# Patient Record
Sex: FEMALE | Race: OTHER | NOT HISPANIC OR LATINO
[De-identification: names, ages, dates, MRNs, and addresses within clinical notes are randomized per-mention and may not be internally consistent; named-entity substitution may affect disease eponyms.]

---

## 2017-04-03 ENCOUNTER — APPOINTMENT (OUTPATIENT)
Dept: GYNECOLOGIC ONCOLOGY | Facility: CLINIC | Age: 82
End: 2017-04-03

## 2017-05-22 ENCOUNTER — APPOINTMENT (OUTPATIENT)
Dept: GYNECOLOGIC ONCOLOGY | Facility: CLINIC | Age: 82
End: 2017-05-22

## 2017-05-22 VITALS
SYSTOLIC BLOOD PRESSURE: 180 MMHG | WEIGHT: 153 LBS | DIASTOLIC BLOOD PRESSURE: 101 MMHG | HEART RATE: 82 BPM | BODY MASS INDEX: 26.26 KG/M2 | RESPIRATION RATE: 16 BRPM

## 2017-09-11 ENCOUNTER — APPOINTMENT (OUTPATIENT)
Dept: GYNECOLOGIC ONCOLOGY | Facility: CLINIC | Age: 82
End: 2017-09-11
Payer: MEDICARE

## 2017-09-11 VITALS
WEIGHT: 153 LBS | DIASTOLIC BLOOD PRESSURE: 79 MMHG | SYSTOLIC BLOOD PRESSURE: 168 MMHG | HEART RATE: 89 BPM | BODY MASS INDEX: 26.12 KG/M2 | HEIGHT: 64 IN

## 2017-09-11 PROCEDURE — 99214 OFFICE O/P EST MOD 30 MIN: CPT

## 2018-03-05 ENCOUNTER — APPOINTMENT (OUTPATIENT)
Dept: GYNECOLOGIC ONCOLOGY | Facility: CLINIC | Age: 83
End: 2018-03-05
Payer: MEDICARE

## 2018-03-05 VITALS — HEART RATE: 83 BPM | WEIGHT: 158.5 LBS | OXYGEN SATURATION: 97 % | BODY MASS INDEX: 27.06 KG/M2 | HEIGHT: 64 IN

## 2018-03-05 PROCEDURE — 99214 OFFICE O/P EST MOD 30 MIN: CPT

## 2018-06-11 ENCOUNTER — APPOINTMENT (OUTPATIENT)
Dept: GYNECOLOGIC ONCOLOGY | Facility: CLINIC | Age: 83
End: 2018-06-11

## 2018-06-25 ENCOUNTER — APPOINTMENT (OUTPATIENT)
Dept: GYNECOLOGIC ONCOLOGY | Facility: CLINIC | Age: 83
End: 2018-06-25
Payer: MEDICARE

## 2018-06-25 DIAGNOSIS — C80.1 MALIGNANT (PRIMARY) NEOPLASM, UNSPECIFIED: ICD-10-CM

## 2018-06-25 DIAGNOSIS — R10.2 PELVIC AND PERINEAL PAIN: ICD-10-CM

## 2018-06-25 DIAGNOSIS — C54.1 MALIGNANT NEOPLASM OF ENDOMETRIUM: ICD-10-CM

## 2018-06-25 DIAGNOSIS — G62.9 POLYNEUROPATHY, UNSPECIFIED: ICD-10-CM

## 2018-06-25 DIAGNOSIS — N95.0 POSTMENOPAUSAL BLEEDING: ICD-10-CM

## 2018-06-25 DIAGNOSIS — I10 ESSENTIAL (PRIMARY) HYPERTENSION: ICD-10-CM

## 2018-06-25 PROCEDURE — 99214 OFFICE O/P EST MOD 30 MIN: CPT

## 2018-06-25 PROCEDURE — 36415 COLL VENOUS BLD VENIPUNCTURE: CPT

## 2020-08-17 ENCOUNTER — APPOINTMENT (OUTPATIENT)
Dept: GYNECOLOGIC ONCOLOGY | Facility: CLINIC | Age: 85
End: 2020-08-17

## 2021-01-11 ENCOUNTER — APPOINTMENT (OUTPATIENT)
Dept: GYNECOLOGIC ONCOLOGY | Facility: CLINIC | Age: 86
End: 2021-01-11
Payer: MEDICARE

## 2021-01-11 VITALS
WEIGHT: 165 LBS | BODY MASS INDEX: 28.17 KG/M2 | HEART RATE: 77 BPM | TEMPERATURE: 97.7 F | HEIGHT: 64 IN | SYSTOLIC BLOOD PRESSURE: 108 MMHG | OXYGEN SATURATION: 97 % | DIASTOLIC BLOOD PRESSURE: 68 MMHG

## 2021-01-11 PROCEDURE — 99214 OFFICE O/P EST MOD 30 MIN: CPT

## 2021-01-11 NOTE — REVIEW OF SYSTEMS
[Negative] : Musculoskeletal [Neuropathy] : neuropathy [Dysuria] : dysuria [Fatigue] : fatigue [Dyspareunia] : no dyspareunia [FreeTextEntry1] : hair is grown back  [FreeTextEntry2] : significant in toes, grade 2.  has used a cane but she rarely uses it now. [FreeTextEntry4] : had some leukocytes in urine.  had some dysuria and took some medication for the discomfort.   [FreeTextEntry7] : mild but still active and travelling all over [place] and [place].   [FreeTextEntry8] : wears glasses which are unchanged.

## 2021-01-11 NOTE — PHYSICAL EXAM
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 [de-identified] : using cane, but can ambulate without it

## 2021-01-11 NOTE — DISCUSSION/SUMMARY
[FreeTextEntry1] : -Clinically TAURUS, with mild peripheral neuropathy\par -Will follow up 1 year\par

## 2021-01-11 NOTE — HISTORY OF PRESENT ILLNESS
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 Normal endometrium 0.8cm. Absent ovaries. \par 2) D&C, hysteroscopy 10/18/16\par    a) High grade carcinoma, favor serous\par 3) Advanced laparoscopic robotic assisted hysterectomy, BSO, sentinel lymph node biopsy, pelvic lymphadenectomy, omentectomy 11/1/16\par     a) Endometrial serous carcinoma DOI 6.7% Tumor size 6cm  neg cervix neg LVSI neg omentum left sentinel Ln 0/1, PLN 0/8\par 4)  Carboplatin/ paclitaxel x 6 completed 3/27/2017 ([facility])\par a) peripheral neuropathy \par 5)  VBRT, ([facility])  x3, completed 5/2017\par 6) CT C/A/P 7/2017 TAURUS\par \par Patient presents today for surveillance visit. First visit in 1.5 years. Feeling well. No abdominal pain, change in bowel habits, vaginal bleeding or discharge. \par \par Health Maintenance\par Mammogram 6/2020\par Reports recent normal colonoscopy

## 2022-07-01 ENCOUNTER — NON-APPOINTMENT (OUTPATIENT)
Age: 87
End: 2022-07-01

## 2022-09-22 ENCOUNTER — NON-APPOINTMENT (OUTPATIENT)
Age: 87
End: 2022-09-22